# Patient Record
Sex: MALE | Race: BLACK OR AFRICAN AMERICAN | Employment: FULL TIME | URBAN - METROPOLITAN AREA
[De-identification: names, ages, dates, MRNs, and addresses within clinical notes are randomized per-mention and may not be internally consistent; named-entity substitution may affect disease eponyms.]

---

## 2020-08-19 ENCOUNTER — HOSPITAL ENCOUNTER (EMERGENCY)
Age: 55
Discharge: HOME | End: 2020-08-19
Attending: EMERGENCY MEDICINE | Admitting: EMERGENCY MEDICINE
Payer: COMMERCIAL

## 2020-08-19 VITALS
SYSTOLIC BLOOD PRESSURE: 171 MMHG | HEIGHT: 75 IN | TEMPERATURE: 98.5 F | BODY MASS INDEX: 34.26 KG/M2 | OXYGEN SATURATION: 96 % | WEIGHT: 275.57 LBS | DIASTOLIC BLOOD PRESSURE: 107 MMHG | RESPIRATION RATE: 20 BRPM | HEART RATE: 83 BPM

## 2020-08-19 DIAGNOSIS — L02.91 ABSCESS: Primary | ICD-10-CM

## 2020-08-19 PROCEDURE — 75810000289 HC I&D ABSCESS SIMP/COMP/MULT

## 2020-08-19 PROCEDURE — 99282 EMERGENCY DEPT VISIT SF MDM: CPT

## 2020-08-19 PROCEDURE — 74011000250 HC RX REV CODE- 250: Performed by: EMERGENCY MEDICINE

## 2020-08-19 RX ORDER — SULFAMETHOXAZOLE AND TRIMETHOPRIM 800; 160 MG/1; MG/1
1 TABLET ORAL 2 TIMES DAILY
Qty: 14 TAB | Refills: 0 | Status: SHIPPED | OUTPATIENT
Start: 2020-08-19 | End: 2020-08-26

## 2020-08-19 RX ORDER — LIDOCAINE HYDROCHLORIDE AND EPINEPHRINE 10; 10 MG/ML; UG/ML
1.5 INJECTION, SOLUTION INFILTRATION; PERINEURAL
Status: COMPLETED | OUTPATIENT
Start: 2020-08-19 | End: 2020-08-19

## 2020-08-19 RX ADMIN — LIDOCAINE HYDROCHLORIDE,EPINEPHRINE BITARTRATE 15 MG: 10; .01 INJECTION, SOLUTION INFILTRATION; PERINEURAL at 10:33

## 2020-08-19 NOTE — ED PROVIDER NOTES
Pt with boil to R flank. Started two days ago. No fever. No drainage. No medications taken at home. No h/o DM. History reviewed. No pertinent past medical history. Past Surgical History:   Procedure Laterality Date    HX HEENT           History reviewed. No pertinent family history. Social History     Socioeconomic History    Marital status:      Spouse name: Not on file    Number of children: Not on file    Years of education: Not on file    Highest education level: Not on file   Occupational History    Not on file   Social Needs    Financial resource strain: Not on file    Food insecurity     Worry: Not on file     Inability: Not on file    Transportation needs     Medical: Not on file     Non-medical: Not on file   Tobacco Use    Smoking status: Current Every Day Smoker    Smokeless tobacco: Never Used   Substance and Sexual Activity    Alcohol use: Yes     Comment: Socailly    Drug use: Never    Sexual activity: Not on file   Lifestyle    Physical activity     Days per week: Not on file     Minutes per session: Not on file    Stress: Not on file   Relationships    Social connections     Talks on phone: Not on file     Gets together: Not on file     Attends [employer]ical service: Not on file     Active member of club or organization: Not on file     Attends meetings of clubs or organizations: Not on file     Relationship status: Not on file    Intimate partner violence     Fear of current or ex partner: Not on file     Emotionally abused: Not on file     Physically abused: Not on file     Forced sexual activity: Not on file   Other Topics Concern    Not on file   Social History Narrative    Not on file         ALLERGIES: Patient has no known allergies. Review of Systems   Constitutional: Negative for diaphoresis and fever. HENT: Negative for facial swelling. Eyes: Negative for visual disturbance. Respiratory: Negative for cough.     Cardiovascular: Negative for chest pain. Gastrointestinal: Negative for abdominal pain. Genitourinary: Negative for dysuria. Musculoskeletal: Negative for joint swelling. Skin: Negative for rash. Neurological: Negative for headaches. Hematological: Negative for adenopathy. Psychiatric/Behavioral: Negative for suicidal ideas. Vitals:    08/19/20 0957   BP: (!) 171/107   Pulse: 83   Resp: 20   Temp: 98.5 °F (36.9 °C)   SpO2: 96%   Weight: 125 kg (275 lb 9.2 oz)   Height: 6' 3\" (1.905 m)            Physical Exam  Vitals signs and nursing note reviewed. Constitutional:       General: He is not in acute distress. Appearance: He is well-developed. HENT:      Head: Normocephalic and atraumatic. Eyes:      Pupils: Pupils are equal, round, and reactive to light. Neck:      Musculoskeletal: Normal range of motion and neck supple. Cardiovascular:      Rate and Rhythm: Normal rate. Pulmonary:      Effort: Pulmonary effort is normal. No respiratory distress. Abdominal:      General: There is no distension. Musculoskeletal: Normal range of motion. Skin:     General: Skin is warm and dry. Comments: 5x8cm area of erythema and induration to R flank. No drainage. Neurological:      Mental Status: He is alert and oriented to person, place, and time. MDM         Procedures    Procedure Note - Incision and Drainage:   Performed by Neri Ureña MD .     Verbal consent was obtained. Immediately prior to the procedure, the patient was reevaluated and found suitable for the planned procedure and any planned medications. Immediately prior to the procedure a time out was called to verify the correct patient, procedure, equipment, staff, and marking as appropriate. The site prepped with ChloraPrep. Anesthesia was obtained via local infiltration with 1% lidocaine and with epinephrine. A 1.5 cm incision was made using a #11 scalpel blade to incise the abscess cavity.    Moderate amount of purulent drainage was expressed. A packing of Iodaform was placed. The procedure was tolerated well. Discharge home on bactrim  Hot compresses  Return to ED for wound check in 2-3 days if needed.

## 2020-08-19 NOTE — ED TRIAGE NOTES
Triage Note: Patient is coming in with abscess to the lower back and on the waist line on the right side. Patient states appeared a couple days ago. No pain prior to the abscess.

## 2020-08-31 ENCOUNTER — VIRTUAL VISIT (OUTPATIENT)
Dept: PRIMARY CARE CLINIC | Age: 55
End: 2020-08-31
Payer: COMMERCIAL

## 2020-08-31 DIAGNOSIS — N52.9 ERECTILE DYSFUNCTION, UNSPECIFIED ERECTILE DYSFUNCTION TYPE: ICD-10-CM

## 2020-08-31 DIAGNOSIS — L02.91 ABSCESS: ICD-10-CM

## 2020-08-31 DIAGNOSIS — R03.0 ELEVATED BLOOD PRESSURE READING: ICD-10-CM

## 2020-08-31 DIAGNOSIS — L02.91 ABSCESS: Primary | ICD-10-CM

## 2020-08-31 DIAGNOSIS — F17.200 SMOKER: ICD-10-CM

## 2020-08-31 PROCEDURE — 99443 PR PHYS/QHP TELEPHONE EVALUATION 21-30 MIN: CPT | Performed by: INTERNAL MEDICINE

## 2020-08-31 NOTE — PROGRESS NOTES
Clifton Osei is a 60 y.o. male, evaluated via audio-only technology on 8/31/2020 for Skin Problem; Erectile Dysfunction; and Hypertension  . Assessment & Plan:   Diagnoses and all orders for this visit:    1. Abscess  -     CBC WITH AUTOMATED DIFF; Future    2. Elevated blood pressure reading  -     METABOLIC PANEL, COMPREHENSIVE; Future  -     LIPID PANEL; Future    3. Erectile dysfunction, unspecified erectile dysfunction type  -     TESTOSTERONE, FREE & TOTAL; Future  -     FSH AND LH; Future    4. Smoker      Abscess recently drained and treated with antibiotics in the ER. Patient feels better clinically. 12  Subjective:       Prior to Admission medications    Not on File     HIstory    Patient is he is establishing care virtually. He was recently seen in the ER for abscess in the left flank region that was drained and he was treated with antibiotics. Patient informed that he is doing better. But he is concerned that he keeps getting these infections on his skin. He wants to know what causes it. He does have a swelling around his waistline. His blood pressure was elevated when he was seen in the ER. He does have ED. Patient denies having diabetes. Denies any other significant medical history    ROS    No flowsheet data found. Clifton Osei, who was evaluated through a patient-initiated, synchronous (real-time) audio only encounter, and/or her healthcare decision maker, is aware that it is a billable service, with coverage as determined by his insurance carrier. He provided verbal consent to proceed: Yes. He has not had a related appointment within my department in the past 7 days or scheduled within the next 24 hours.       Total Time: minutes: 21-30 minutes    Nelida Cogan, MD
normal (ped)...

## 2023-01-30 ENCOUNTER — NURSE TRIAGE (OUTPATIENT)
Dept: OTHER | Facility: CLINIC | Age: 58
End: 2023-01-30

## 2023-01-30 NOTE — TELEPHONE ENCOUNTER
Location of patient: [address] ru[place] from [facility] at [facility] with [employer]. Subjective: Caller states \"I've had some rectal bleeding at times over the last six months. I'll have some heartburn from time to time. There can be some burning sensation in the rectal area when having a bowel movement. On 1/29/23, it was the first time there was red water in the toilet. \"     Onset:   off and on for 6 months    Pain Severity:   some heartburn from time to time and some burning during BM     What has been tried: went to ED once, it was heartburn, gave me Pepcid    Recommended disposition: See PCP within 3 Days    Care advice provided, patient verbalizes understanding; denies any other questions or concerns; instructed to call back for any new or worsening symptoms. Pro Mendoza , Service Expert, is working to get a practice and an appt for pt. Attention Provider: Thank you for allowing me to participate in the care of your patient. The patient was connected to triage in response to information provided to the [facility]. Please do not respond through this encounter as the response is not directed to a shared pool.     Reason for Disposition   MILD rectal bleeding (more than just a few drops or streaks)    Protocols used: Rectal Bleeding-ADULT-AH